# Patient Record
Sex: FEMALE | Race: WHITE | NOT HISPANIC OR LATINO | Employment: UNEMPLOYED | URBAN - METROPOLITAN AREA
[De-identification: names, ages, dates, MRNs, and addresses within clinical notes are randomized per-mention and may not be internally consistent; named-entity substitution may affect disease eponyms.]

---

## 2017-01-18 ENCOUNTER — OFFICE VISIT - HEALTHEAST (OUTPATIENT)
Dept: FAMILY MEDICINE | Facility: CLINIC | Age: 27
End: 2017-01-18

## 2017-01-18 ENCOUNTER — COMMUNICATION - HEALTHEAST (OUTPATIENT)
Dept: SCHEDULING | Facility: CLINIC | Age: 27
End: 2017-01-18

## 2017-01-18 DIAGNOSIS — Z34.90 PREGNANCY: ICD-10-CM

## 2017-01-18 DIAGNOSIS — J06.9 URI (UPPER RESPIRATORY INFECTION): ICD-10-CM

## 2017-01-18 DIAGNOSIS — J45.20 INTERMITTENT ASTHMA, UNCOMPLICATED: ICD-10-CM

## 2017-01-18 RX ORDER — SWAB
1 SWAB, NON-MEDICATED MISCELLANEOUS DAILY
Qty: 30 TABLET | Refills: 12 | Status: SHIPPED | OUTPATIENT
Start: 2017-01-18

## 2017-01-18 ASSESSMENT — MIFFLIN-ST. JEOR: SCORE: 1391.53

## 2017-02-07 ENCOUNTER — RECORDS - HEALTHEAST (OUTPATIENT)
Dept: ADMINISTRATIVE | Facility: OTHER | Age: 27
End: 2017-02-07

## 2017-02-09 ENCOUNTER — PRENATAL OFFICE VISIT - HEALTHEAST (OUTPATIENT)
Dept: FAMILY MEDICINE | Facility: CLINIC | Age: 27
End: 2017-02-09

## 2017-02-09 DIAGNOSIS — N91.2 AMENORRHEA: ICD-10-CM

## 2017-02-09 LAB — HIV 1+2 AB+HIV1 P24 AG SERPL QL IA: NEGATIVE

## 2017-02-10 LAB
HBV SURFACE AG SERPL QL IA: NEGATIVE
SYPHILIS RPR SCREEN - HISTORICAL: NORMAL

## 2017-02-13 ENCOUNTER — HOSPITAL ENCOUNTER (OUTPATIENT)
Dept: ULTRASOUND IMAGING | Facility: CLINIC | Age: 27
Discharge: HOME | End: 2017-02-13
Attending: FAMILY MEDICINE

## 2017-02-13 DIAGNOSIS — N91.2 AMENORRHEA: ICD-10-CM

## 2017-02-16 LAB
BKR LAB AP ABNORMAL BLEEDING: NO
BKR LAB AP BIRTH CONTROL/HORMONES: NORMAL
BKR LAB AP CERVICAL APPEARANCE: NORMAL
BKR LAB AP GYN ADEQUACY: NORMAL
BKR LAB AP GYN INTERPRETATION: NORMAL
BKR LAB AP HPV REFLEX: NORMAL
BKR LAB AP LMP: NORMAL
BKR LAB AP PATIENT STATUS: NORMAL
BKR LAB AP PREVIOUS ABNORMAL: NO
BKR LAB AP PREVIOUS NORMAL: NORMAL
HIGH RISK?: NO
PATH REPORT.COMMENTS IMP SPEC: NORMAL
RESULT FLAG (HE HISTORICAL CONVERSION): NORMAL

## 2017-02-18 ENCOUNTER — COMMUNICATION - HEALTHEAST (OUTPATIENT)
Dept: FAMILY MEDICINE | Facility: CLINIC | Age: 27
End: 2017-02-18

## 2017-04-14 ENCOUNTER — COMMUNICATION - HEALTHEAST (OUTPATIENT)
Dept: FAMILY MEDICINE | Facility: CLINIC | Age: 27
End: 2017-04-14

## 2017-04-18 ENCOUNTER — PRENATAL OFFICE VISIT - HEALTHEAST (OUTPATIENT)
Dept: FAMILY MEDICINE | Facility: CLINIC | Age: 27
End: 2017-04-18

## 2017-04-18 ENCOUNTER — COMMUNICATION - HEALTHEAST (OUTPATIENT)
Dept: FAMILY MEDICINE | Facility: CLINIC | Age: 27
End: 2017-04-18

## 2017-04-18 DIAGNOSIS — F41.9 ANXIETY: ICD-10-CM

## 2017-04-18 DIAGNOSIS — Z34.90 PREGNANCY: ICD-10-CM

## 2017-04-19 LAB — SYPHILIS RPR SCREEN - HISTORICAL: NORMAL

## 2017-05-09 ENCOUNTER — HOSPITAL ENCOUNTER (OUTPATIENT)
Dept: MEDSURG UNIT | Facility: CLINIC | Age: 27
Discharge: HOME | End: 2017-05-10
Attending: FAMILY MEDICINE | Admitting: FAMILY MEDICINE

## 2017-05-09 ENCOUNTER — COMMUNICATION - HEALTHEAST (OUTPATIENT)
Dept: SCHEDULING | Facility: CLINIC | Age: 27
End: 2017-05-09

## 2017-05-09 ENCOUNTER — COMMUNICATION - HEALTHEAST (OUTPATIENT)
Dept: FAMILY MEDICINE | Facility: CLINIC | Age: 27
End: 2017-05-09

## 2017-05-09 ASSESSMENT — MIFFLIN-ST. JEOR: SCORE: 1468.64

## 2017-05-23 ENCOUNTER — PRENATAL OFFICE VISIT - HEALTHEAST (OUTPATIENT)
Dept: FAMILY MEDICINE | Facility: CLINIC | Age: 27
End: 2017-05-23

## 2017-05-23 DIAGNOSIS — Z3A.32 32 WEEKS GESTATION OF PREGNANCY: ICD-10-CM

## 2017-06-06 ENCOUNTER — PRENATAL OFFICE VISIT - HEALTHEAST (OUTPATIENT)
Dept: FAMILY MEDICINE | Facility: CLINIC | Age: 27
End: 2017-06-06

## 2017-06-06 DIAGNOSIS — Z34.90 PREGNANCY: ICD-10-CM
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
## 2017-06-20 ENCOUNTER — PRENATAL OFFICE VISIT - HEALTHEAST (OUTPATIENT)
Dept: FAMILY MEDICINE | Facility: CLINIC | Age: 27
End: 2017-06-20

## 2017-06-20 DIAGNOSIS — N93.9 VAGINAL BLEEDING: ICD-10-CM

## 2017-06-20 DIAGNOSIS — Z3A.36 36 WEEKS GESTATION OF PREGNANCY: ICD-10-CM

## 2021-05-30 VITALS — HEIGHT: 64 IN | BODY MASS INDEX: 25.95 KG/M2 | WEIGHT: 152 LBS

## 2021-05-30 VITALS — BODY MASS INDEX: 29.47 KG/M2 | WEIGHT: 169 LBS

## 2021-05-30 VITALS — BODY MASS INDEX: 25.98 KG/M2 | WEIGHT: 149 LBS

## 2021-05-30 VITALS — BODY MASS INDEX: 27.32 KG/M2 | HEIGHT: 64 IN

## 2021-05-30 VITALS — BODY MASS INDEX: 27.32 KG/M2 | WEIGHT: 156.7 LBS

## 2021-05-31 ENCOUNTER — RECORDS - HEALTHEAST (OUTPATIENT)
Dept: ADMINISTRATIVE | Facility: CLINIC | Age: 31
End: 2021-05-31

## 2021-05-31 VITALS — BODY MASS INDEX: 30.34 KG/M2 | WEIGHT: 174 LBS

## 2021-05-31 VITALS — BODY MASS INDEX: 28.85 KG/M2 | HEIGHT: 64 IN | WEIGHT: 169 LBS

## 2021-05-31 VITALS — BODY MASS INDEX: 30.16 KG/M2 | WEIGHT: 173 LBS

## 2021-05-31 VITALS — WEIGHT: 174 LBS | BODY MASS INDEX: 30.34 KG/M2

## 2021-06-01 ENCOUNTER — RECORDS - HEALTHEAST (OUTPATIENT)
Dept: ADMINISTRATIVE | Facility: CLINIC | Age: 31
End: 2021-06-01

## 2021-06-02 ENCOUNTER — RECORDS - HEALTHEAST (OUTPATIENT)
Dept: ADMINISTRATIVE | Facility: CLINIC | Age: 31
End: 2021-06-02

## 2021-06-08 NOTE — PROGRESS NOTES
PRENATAL VISIT   FIRST OBSTETRICAL EXAM - OB    Assessment / Impression     26-year-old  014 at approximately 17-20 weeks gestation.  She is unsure of her last menstrual period because she is probably about 17-18 weeks gestation.  She clinically measures at 20 weeks' gestation.  4.  Refer her for an ultrasound.  Placenta get a fetal survey and get dating from this ultrasound also.  She is taking a prenatal vitamin and taking care of herself during the course of this pregnancy.  Factors for the pregnancy include a history of prior  delivery at 36 weeks and prenatal late prenatal care.  She has a history of bipolar and anxiety and is currently off her medications.  She wishes to try to stay off of them unless these become more problematic.  She has good support from her significant other and is currently caring for her 4 daughters at home.  She is not working.  Normal first prenatal visit at [facility]  Discussed orientation, general information, lifestyle, nutrition, exercise,warning signs, resources, lab testing, risk screening and discussed cystic fibrosis screening with patient.  Questions answered.    Plan:     Routine cares plan discussed to transfer care to provider in [place] when her insurance transfers   Initial labs drawn.  Prenatal vitamins.  Problem list reviewed and updated.  Genetic screening test options discussed:  Patient elects We will offer her genetic screening based on her dates we have the ultrasound is [facility].  Role of ultrasound in pregnancy discussed; fetal survey: ordered.  Follow up: No Follow-up on file.      Subjective:    Therese Garcia is a 26 y.o.  here today for her First Obstetrical Exam.  This is her 6 pregnancy.  She has 4 little girls at home and had 1 miscarriage.  The father of the baby and her fiancé is here with her today.  This was a planned pregnancy and the very excited about this pregnancy.  They do plan to move to [facility] and do intend  on transferring care there.  She has a history of 2  deliveries at 36 weeks otherwise her pregnancies were uncomplicated.  She is only taking prenatal vitamins.  She is a former smoker but is not currently smoking and has no other history of drug or alcohol use or abuse.  She has some fairly significant history of bipolar, anxiety and PTSD.  She states she is currently doing well off of her Lexapro might remain off of that for now.    OB History    Para Term  AB SAB TAB Ectopic Multiple Living   6 4 2 2 1 1    4      # Outcome Date GA Lbr Compa/2nd Weight Sex Delivery Anes PTL Lv   6 Current            5  08/28/15 36w0d  5 lb 13 oz (2.637 kg) F Vag-Spont EPI Y Y   4 Term 12 40w0d  10 lb (4.536 kg) F Vag-Spont EPI N Y   3 Term 11   7 lb 5 oz (3.317 kg) F Vag-Spont EPI N Y   2 SAB 06/09/10    U SAB      1  09 36w0d  5 lb 12 oz (2.608 kg) F Vag-Spont EPI Y Y          Expected Date of Delivery: Not found.    Past Medical History:   Diagnosis Date     Anxiety      Asthma      Migraine      Past Surgical History:   Procedure Laterality Date     DILATION AND CURETTAGE OF UTERUS       Nexplanon Placement       Social History   Substance Use Topics     Smoking status: Former Smoker     Types: Cigarettes     Quit date: 2014     Smokeless tobacco: Never Used     Alcohol use No      Comment: rare     Current Outpatient Prescriptions   Medication Sig Dispense Refill     acetaminophen (TYLENOL) 500 MG tablet Take 1,000 mg by mouth every 6 (six) hours as needed for pain.       albuterol (PROVENTIL HFA;VENTOLIN HFA) 90 mcg/actuation inhaler Inhale 2 puffs every 4 (four) hours as needed for wheezing. 1 Inhaler 0     ALPRAZolam (XANAX) 1 MG tablet Take 1 tablet (1 mg total) by mouth 2 (two) times a day as needed. 20 tablet 0     aspirin-acetaminophen-caffeine (EXCEDRIN MIGRAINE) 250-250-65 mg per tablet Take 1 tablet by mouth every 6 (six) hours as needed for pain.        escitalopram oxalate (LEXAPRO) 20 MG tablet Take 1 tablet (20 mg total) by mouth bedtime. 90 tablet 0     ibuprofen (ADVIL,MOTRIN) 200 MG tablet Take 400 mg by mouth every 6 (six) hours as needed for pain.       nebulizer and compressor Magdalena Please dispense nebulizer machine 1 each 0     prenatal vitamin iron-folic acid 27mg-0.8mg (PRENATAL S) 27 mg iron- 800 mcg Tab tablet Take 1 tablet by mouth daily. 30 tablet 12     vitamin A & D ointment Apply 1 application topically as needed.       acetaminophen-mag sal-pamabrom (PAMPRIN, WITH MAG SALICYLATE,) 250-250-25 mg Tab Take 2 tablets by mouth every 6 (six) hours as needed.       albuterol (PROVENTIL HFA;VENTOLIN HFA) 90 mcg/actuation inhaler Inhale 2 puffs every 4 (four) hours as needed for wheezing. 1 Inhaler 0     SUMAtriptan (IMITREX) 50 MG tablet Take 1 tablet (50 mg total) by mouth every 2 (two) hours as needed for migraine. 10 tablet 2     No current facility-administered medications for this visit.      Allergies   Allergen Reactions     Hydrocodone              High Risk Behavior: Currently unmarried and Previous  labor with term delivery    Review of Systems  General:  Denies problem  Eyes: Denies problem  Ears/Nose/Throat: Denies problem  Cardiovascular: Denies problem  Respiratory:  Denies problem  Gastrointestinal:  Denies problem, Genitourinary: Denies problem  Musculoskeletal:  Denies problem  Skin: Denies problem  Neurologic: Denies problem  Psychiatric: Denies problem  Endocrine: Denies problem  Heme/Lymphatic: Denies problem   Allergic/Immunologic: Denies problem       Objective:   Objective    Vitals:    17 1357   BP: 118/84   Weight: 149 lb (67.6 kg)     Physical Exam:  General Appearance: Alert, cooperative, no distress, appears stated age  Head: Normocephalic, without obvious abnormality, atraumatic  Eyes: PERRL, conjunctiva/corneas clear, EOM's intact  Ears: Normal TM's and external ear canals, both ears  Nose: Nares normal, septum  midline,mucosa normal, no drainage  Throat: Lips, mucosa, and tongue normal; teeth and gums normal  Neck: Supple, symmetrical, trachea midline, no adenopathy;  thyroid: not enlarged, symmetric, no tenderness/mass/nodules; no carotid bruit or JVD  Back: Symmetric, no curvature, ROM normal, no CVA tenderness  Lungs: Clear to auscultation bilaterally, respirations unlabored  Breasts: No breast masses, tenderness, asymmetry, or nipple discharge.  Heart: Regular rate and rhythm, S1 and S2 normal, no murmur, rub, or gallop, Abdomen: Soft, non-tender, bowel sounds active all four quadrants,  no masses, no organomegaly  Pelvic:Normally developed genitalia with no external lesions or eruptions. Vagina and cervix show no lesions, inflammation, discharge or tenderness. No cystocele, No rectocele. Uterus 20 weeks.  No adnexal mass or tenderness.    Extremities: Extremities normal, atraumatic, no cyanosis or edema  Skin: Skin color, texture, turgor normal, no rashes or lesions  Lymph nodes: Cervical, supraclavicular, and axillary nodes normal  Neurologic: Normal     Lab:   Results for orders placed or performed in visit on 02/09/17   Pregnancy (Beta-hCG, Qual), Urine   Result Value Ref Range    Pregnancy Test, Urine Positive (!) Negative

## 2021-06-08 NOTE — PROGRESS NOTES
Assessment/Plan:     1. Intermittent asthma, uncomplicated  2. URI (upper respiratory infection)  Has albuterol inhaler at home.  No signs of needing antibiotics at this time but call return to care if symptoms worsen or does not improve.  Discussed medication today for pregnancy.  - guaiFENesin (ROBITUSSIN) 100 mg/5 mL syrup; Take 10 mL (200 mg total) by mouth 3 (three) times a day as needed for cough or congestion.  Dispense: 120 mL; Refill: 1    3. Pregnancy  We will plan to establish with OB provider.  Gave Reglan and prescription of prenatal vitamins  - metoclopramide (REGLAN) 5 MG tablet; Take 1 tablet (5 mg total) by mouth 4 (four) times a day as needed for nausea.  Dispense: 20 tablet; Refill: 0  - prenatal vitamin iron-folic acid 27mg-0.8mg (PRENATAL S) 27 mg iron- 800 mcg Tab tablet; Take 1 tablet by mouth daily.  Dispense: 30 tablet; Refill: 12    Subjective:      Therese Garcia is a 26 y.o. female comes in today with upper respiratory infection.  She states that she has had a dry cough started about 3 days ago.  She states she had difficulty sleeping last night because of the cough she states she felt a little clammy does have a history of asthma and has albuterol inhaler at home.  She has had no fevers the cough is nonproductive she states that she is about 13 weeks pregnant.  She has had a little nausea with this pregnancy has been established with OB provider yet.  No significant abdominal pain or bleeding.  200 medications are safe for pregnant upper respiratory infections.  Otherwise no other new concerns.  Reviewed last note with primary care provider.    Current Outpatient Prescriptions   Medication Sig Dispense Refill     acetaminophen (TYLENOL) 500 MG tablet Take 1,000 mg by mouth every 6 (six) hours as needed for pain.       acetaminophen-mag sal-pamabrom (PAMPRIN, WITH MAG SALICYLATE,) 250-250-25 mg Tab Take 2 tablets by mouth every 6 (six) hours as needed.       albuterol (PROVENTIL  HFA;VENTOLIN HFA) 90 mcg/actuation inhaler Inhale 2 puffs every 4 (four) hours as needed for wheezing. 1 Inhaler 0     albuterol (PROVENTIL HFA;VENTOLIN HFA) 90 mcg/actuation inhaler Inhale 2 puffs every 4 (four) hours as needed for wheezing. 1 Inhaler 0     ALPRAZolam (XANAX) 1 MG tablet Take 1 tablet (1 mg total) by mouth 2 (two) times a day as needed. 20 tablet 0     aspirin-acetaminophen-caffeine (EXCEDRIN MIGRAINE) 250-250-65 mg per tablet Take 1 tablet by mouth every 6 (six) hours as needed for pain.       escitalopram oxalate (LEXAPRO) 20 MG tablet Take 1 tablet (20 mg total) by mouth bedtime. 90 tablet 0     ibuprofen (ADVIL,MOTRIN) 200 MG tablet Take 400 mg by mouth every 6 (six) hours as needed for pain.       nebulizer and compressor Magdalena Please dispense nebulizer machine 1 each 0     prenatal vitamin iron-folic acid 27mg-0.8mg (PRENATAL S) 27 mg iron- 800 mcg Tab tablet Take 1 tablet by mouth daily. 30 tablet 12     SUMAtriptan (IMITREX) 50 MG tablet Take 1 tablet (50 mg total) by mouth every 2 (two) hours as needed for migraine. 10 tablet 2     vitamin A & D ointment Apply 1 application topically as needed.       No current facility-administered medications for this visit.        Past Medical History, Family History, and Social History reviewed.  Past Medical History:   Diagnosis Date     Anxiety      Asthma      Migraine      Past Surgical History:   Procedure Laterality Date     DILATION AND CURETTAGE OF UTERUS       Nexplanon Placement       Hydrocodone  Family History   Problem Relation Age of Onset     Depression Mother      Depression Father      Social History     Social History     Marital status:      Spouse name: N/A     Number of children: N/A     Years of education: N/A     Occupational History     Not on file.     Social History Main Topics     Smoking status: Former Smoker     Types: Cigarettes     Quit date: 9/12/2014     Smokeless tobacco: Never Used     Alcohol use Yes       "Comment: rare     Drug use: No     Sexual activity: Yes     Partners: Male     Birth control/ protection: Implant      Comment: engaged      Other Topics Concern     Not on file     Social History Narrative    Lives with family.         Review of systems is as stated in HPI, and the remainder of the 10 system review is otherwise negative.    Objective:     Vitals:    01/18/17 0837   BP: 104/66   Pulse: (!) 107   Temp: 98.3  F (36.8  C)   SpO2: 94%   Weight: 152 lb (68.9 kg)   Height: 5' 3.5\" (1.613 m)    Body mass index is 26.5 kg/(m^2).    General Appearance:    Alert, cooperative, no distress, appears stated age   Head:    Normocephalic, without obvious abnormality, atraumatic   Eyes:    PERRL, EOM's intact   Ears:    Normal TM's and external ear canals   Nose:   Mucosa normal, no drainage     or sinus tenderness   Throat:   Oropharynx is clear   Neck:   Supple, symmetrical, no adenopathy, no thyromegally       Lungs:     Clear to auscultation bilaterally, respirations unlabored   Chest Wall:    No tenderness or deformity    Heart:    Regular rate and rhythm, S1 and S2 normal, no murmur, rub    or gallop                   Extremities:   Extremities normal, atraumatic, no cyanosis or edema       Skin:   No rashes or lesions       This note has been dictated using voice recognition software. Any grammatical or context distortions are unintentional and inherent to the the software.   "

## 2021-06-10 NOTE — PROGRESS NOTES
Patient given discharge instructions. Patient left with belongings and ambulating with no difficulty.

## 2021-06-10 NOTE — PROGRESS NOTES
Offered progenity for genetic screening and pt declined  Reviewed fetal survey- normal at 17 weeks  Feels lightheaded- check hemogram  Insurance states she should continue to come here for prenatal care but deliver at [place]- will continue to help her with this  Rhogam, 1hr gtt syphilis today,  Decrease mood, not taking any meds currently. Feels anxious.  Has hx of bipolar depression- start sertraline, follow up in 2 weeks.

## 2021-06-10 NOTE — PROGRESS NOTES
Doing well  No abdominal pain, cramping, urinary symptoms  Good fetal movement  Plants to deliver in [place] - will be moving end of June to [place] fall  Baby girl- 4 girls at home- this is second child with current spouse  Follow up in 2 weeks  Anxiety/PTSD better controlled on sertraline, still with some symptoms- increase to 50 mg daily, new rx sent

## 2021-06-11 NOTE — PROGRESS NOTES
Had a deer tick on her this morning.  Felt some nausea.  She states she was out fishing last night and feels that that is probably where she did get the tick.  We discussed options of waiting and signs of Lyme's including fever and target lesions.  Should like to be treated empirically since she is pregnant and also moving to assure that there is no lapse in treatment.  We prescribed amoxicillin 500 mg 3 times daily for 14 days.  Otherwise she is doing well with regards to the pregnancy.  Good fetal movement.  No contractions, edema, or headaches.  She continues on her Zoloft and feels that her symptoms for bipolar well controlled.  She is looking forward to moving.  She intends to continue with her prenatal care here but intends to deliver at [facility].

## 2021-06-11 NOTE — PROGRESS NOTES
26-year-old  with a negative blood type presents today at 35 weeks and 2 days gestation.  She reports that she has been having some bleeding over the last day.  She states it was some spotting and was bright red blood.  She has not had intercourse for 2 days.  She is not experiencing any fluid leakage.  No abdominal pain or trauma.  She is feeling good fetal movement.  She denies any contractions.  On exam her abdomen is soft and measuring appropriately for gestational age.  Fetal heart tones are 148.  Pelvic exam reveals clear to yellow mucus without any blood or blood tingeing.  Cervix appears long and closed.  Due to the reported bleeding and a negative blood type, RhoGam is administered.  GBS is done today.  We will send a wet prep as well as a UA UC.  She is planning to move to [place] and plans to transfer her care and deliver at [facility].  She will arrange for her next office visit to be within the next week so she can establish care with a new provider.

## 2021-06-15 PROBLEM — Z34.90 PREGNANCY: Status: ACTIVE | Noted: 2017-04-18

## 2023-09-18 NOTE — PROGRESS NOTES
".       PM&R Clinic Note     Patient Name: Therese Garcia : 1990 Medical Record: [id number]     Requesting Physician/clinician: No att. providers found           History of Present Illness:     Therese Garcia is 32 year old female referred for concussion evaluation.    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 No LOC but \"zoned out\". Injury sustained     Currently making gains with PT & OT.   Reports dizziness with standing for more than 8 min and results in LOC with the last episode happened 3 weeks ago.   C/O nausea that improved. No recent episode of vomiting.   Reports poor sleep quality with challenges falling asleep and having interrupted sleep.  Mood has been unstable since the concussion. Feels easily irritated and upset as she does not making progress as she anticipated. Per  (who was present in the visit) patient is easily irritated. No safety concerns.     Reports worsened short term memory that is getting better with \"brain fog\" but no safety concerns.  Currently studying mental health in college and had to take time out.    Has been off work and employer is accommodative.    Patient has multiple concussion during childhood.           Past Medical and Surgical History:     No past medical history on file.  Past Surgical History:   Procedure Laterality Date    DILATION AND CURETTAGE      OTHER SURGICAL HISTORY      Nexplanon Placement            Social History:     Social History     Tobacco Use    Smoking status: Some Days     Types: Cigarettes     Last attempt to quit: 2014     Years since quittin.0    Smokeless tobacco: Never    Tobacco comments:     Smokes a few times a week   Substance Use Topics    Alcohol use: No     Comment: Alcoholic Drinks/day: rare            Functional history:       ADLs: as above  iADLs (medication management and finances): as above           Family History:     Family History   Problem " "Relation Age of Onset    Depression Mother     Depression Father             Medications:     Current Outpatient Medications   Medication Sig Dispense Refill    acetaminophen (TYLENOL) 500 MG tablet [ACETAMINOPHEN (TYLENOL) 500 MG TABLET] Take 1,000 mg by mouth every 6 (six) hours as needed for pain.      acetaminophen-mag sal-pamabrom (PAMPRIN, WITH MAG SALICYLATE,) 250-250-25 mg Tab [ACETAMINOPHEN-MAG SAL-PAMABROM (PAMPRIN, WITH MAG SALICYLATE,) 250-250-25 MG TAB] Take 2 tablets by mouth every 6 (six) hours as needed.      albuterol (PROVENTIL HFA;VENTOLIN HFA) 90 mcg/actuation inhaler [ALBUTEROL (PROVENTIL HFA;VENTOLIN HFA) 90 MCG/ACTUATION INHALER] Inhale 2 puffs every 4 (four) hours as needed for wheezing. 1 Inhaler 0    albuterol (PROVENTIL HFA;VENTOLIN HFA) 90 mcg/actuation inhaler [ALBUTEROL (PROVENTIL HFA;VENTOLIN HFA) 90 MCG/ACTUATION INHALER] Inhale 2 puffs every 4 (four) hours as needed for wheezing. 1 Inhaler 0    nebulizer and compressor Seema [NEBULIZER AND COMPRESSOR SEEMA] Please dispense nebulizer machine 1 each 0    prenatal vitamin iron-folic acid 27mg-0.8mg (PRENATAL S) 27 mg iron- 800 mcg Tab tablet [PRENATAL VITAMIN IRON-FOLIC ACID 27MG-0.8MG (PRENATAL S) 27 MG IRON- 800 MCG TAB TABLET] Take 1 tablet by mouth daily. 30 tablet 12    sertraline (ZOLOFT) 50 MG tablet [SERTRALINE (ZOLOFT) 50 MG TABLET] Take 1 tablet (50 mg total) by mouth daily. 30 tablet 3    vitamin A & D ointment [VITAMIN A & D OINTMENT] Apply 1 application topically as needed.              Allergies:     Allergies   Allergen Reactions    Hydrocodone Unknown              ROS:     A focused ROS is negative other than the symptoms noted above in the HPI.           Physical Examiniation:     VITAL SIGNS: There were no vitals taken for this visit.  BMI: Estimated body mass index is 30.34 kg/m  as calculated from the following:    Height as of 5/9/17: 1.613 m (5' 3.5\").    Weight as of 6/20/17: 78.9 kg (174 lb).    Gen: NAD, pleasant " and cooperative   Neuro/MSK:   Normal gait.  Normal complete neuro exam  No UMN signs identified         Laboratory/Imaging:     INDICATION: Memory loss   COMPARISON: None.   TECHNIQUE: Routine CT Head without IV contrast. Multiplanar reformats. Dose reduction techniques were used.     FINDINGS:     INTRACRANIAL CONTENTS: Gray-white matter differentiation is maintained. No hemorrhage or extraaxial collection. No mass effect. Subarachnoid cisterns are patent. Normal parenchymal attenuation for age. Normal ventricles and sulci.     VISUALIZED ORBITS/SINUSES/MASTOIDS: No visible intraorbital abnormality. Paranasal sinuses are clear. No middle ear or mastoid effusion.     BONES/SOFT TISSUES: No acute abnormality.            Assessment/Plan:     .(S06.0XAA) Concussion with unknown loss of consciousness status, initial encounter  (primary encounter diagnosis)      Patient education: In depth discussion and education was provided about the assessment and implications of each of the below recommendations for management. Patient indicated readiness to learn, all questions were answered and understanding of material presented was confirmed.    Work-up: none needed at this time    Therapy/equipment/braces: SLP to help with cognitive rehabilitation.    Medications: none needed at this time    Referral / follow up with other providers: neuropsychology for a complete cognitive evaluation. Dr. Ferrara for post concussion mood care    Follow up: 3 months  A letter to be off work until then was issued.    Tenisha Hardin MD  Physical Medicine & Rehabilitation    90 minutes spent on the date of the encounter doing chart review, history and exam, documentation and further activities as noted above

## 2023-09-19 ENCOUNTER — OFFICE VISIT (OUTPATIENT)
Dept: PHYSICAL MEDICINE AND REHAB | Facility: CLINIC | Age: 33
End: 2023-09-19
Payer: COMMERCIAL

## 2023-09-19 VITALS — HEART RATE: 93 BPM | SYSTOLIC BLOOD PRESSURE: 109 MMHG | DIASTOLIC BLOOD PRESSURE: 71 MMHG

## 2023-09-19 DIAGNOSIS — S06.0XAA CONCUSSION WITH UNKNOWN LOSS OF CONSCIOUSNESS STATUS, INITIAL ENCOUNTER: Primary | ICD-10-CM

## 2023-09-19 PROCEDURE — 99417 PROLNG OP E/M EACH 15 MIN: CPT | Performed by: PHYSICAL MEDICINE & REHABILITATION

## 2023-09-19 PROCEDURE — 99205 OFFICE O/P NEW HI 60 MIN: CPT | Performed by: PHYSICAL MEDICINE & REHABILITATION

## 2023-09-19 NOTE — LETTER
September 19, 2023      Therese Garcia  [address]  [place] [address]        To Whom It May Concern:    Therese Garcia  was seen on 9/19/23.  Please excuse her until further evaluation in 3 months      Sincerely,        Tenisha Hardin MD

## 2023-11-02 ENCOUNTER — OFFICE VISIT (OUTPATIENT)
Dept: NEUROLOGY | Facility: CLINIC | Age: 33
End: 2023-11-02
Attending: PHYSICAL MEDICINE & REHABILITATION
Payer: COMMERCIAL

## 2023-11-02 DIAGNOSIS — F43.10 PTSD (POST-TRAUMATIC STRESS DISORDER): ICD-10-CM

## 2023-11-02 DIAGNOSIS — F43.23 ADJUSTMENT DISORDER WITH MIXED ANXIETY AND DEPRESSED MOOD: Primary | ICD-10-CM

## 2023-11-02 DIAGNOSIS — S06.0XAA CONCUSSION WITH UNKNOWN LOSS OF CONSCIOUSNESS STATUS, INITIAL ENCOUNTER: ICD-10-CM

## 2023-11-02 DIAGNOSIS — G47.00 INSOMNIA, UNSPECIFIED TYPE: ICD-10-CM

## 2023-11-02 PROCEDURE — 96132 NRPSYC TST EVAL PHYS/QHP 1ST: CPT | Performed by: CLINICAL NEUROPSYCHOLOGIST

## 2023-11-02 PROCEDURE — 96116 NUBHVL XM PHYS/QHP 1ST HR: CPT | Performed by: CLINICAL NEUROPSYCHOLOGIST

## 2023-11-02 PROCEDURE — 96133 NRPSYC TST EVAL PHYS/QHP EA: CPT | Performed by: CLINICAL NEUROPSYCHOLOGIST

## 2023-11-02 PROCEDURE — 96138 PSYCL/NRPSYC TECH 1ST: CPT | Performed by: CLINICAL NEUROPSYCHOLOGIST

## 2023-11-02 NOTE — LETTER
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 Figure Recall).     On a test that requires speeded alpha-numeric sequencing/cognitive set-shifting (Trails [facility]), performance was exceptionally low and with 1 error.      On the PHQ-9, a self-report measure of depressive symptoms, she obtained a score of 9, placing her in the range of mild depression. She denied suicidal ideation. On the HOLLY-7, a self-report measure of anxiety, she obtained a score of 6,  placing her in the range of mild anxiety.    On the SCAT-3, a self-report measure of concussive symptoms, the patient endorsed a total score of 18/22, with a symptom severity score of 29/132 (generally mild). Specifically, she endorsed mild symptoms of headache, pressure in the head, neck pain, dizziness, balance problems, sensitivity to light, feeling slowed down, feeling in a fog, not feeling right, fatigue or low energy, confusion, trouble falling asleep, feeling more emotional, irritability, sadness and nervous or anxious. Moderate symptoms of difficulty concentrating were endorsed. Lastly, the patient endorsed severe symptoms of difficulty remembering.      ____________________________________________________________________________________    SERVICES PROVIDED & TIME:  On-site Office Visit Details   Verbal consent for neuropsychological testing was received following the provision of information about the nature and purpose of the evaluation, and the opportunity to ask questions. Verbal permission to route a copy of the final report to her primary care provider was also obtained.  A clinical interview/neurobehavioral status examination was conducted with the patient and documented. I thoroughly reviewed the medical record, selected the neuropsychological test battery, provided supervision to the trained examiner/technician, interpreted/integrated patient data and test results, and engaged in clinical decision making, treatment planning, report writing/preparation and and provision of same-day feedback of test results. A trained examiner/technician administered and scored the neuropsychological tests (2+ tests).  Please see below for a breakdown of time spent and the associated codes billed for these services.  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 She was referred for an evaluation of mild neurocognitive disorder.         The patient was seen for a neuropsychological evaluation for the purposes of diagnostic clarification and treatment planning. 35 minutes of face-to-face testing were provided by this  writer. An additional 5 minutes were spent scoring and compiling test results. The patient was cooperative with testing. No concerns were brought to my attention. Please see Dr. Pablo's report for a detailed description of the charges and interpretation and integration of the findings.       Again, thank you for allowing me to participate in the care of your patient.        Sincerely,        Anirudh [email or web address], LP

## 2023-11-02 NOTE — PROGRESS NOTES
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 HOLLY-7, a self-report measure of anxiety, she obtained a score of 6,  placing her in the range of mild anxiety.    On the SCAT-3, a self-report measure of concussive symptoms, the patient endorsed a total score of 18/22, with a symptom severity score of 29/132 (generally mild). Specifically, she endorsed mild symptoms of headache, pressure in the head, neck pain, dizziness, balance problems, sensitivity to light, feeling slowed down, feeling in a fog, not feeling right, fatigue or low energy, confusion, trouble falling asleep, feeling more emotional, irritability, sadness and nervous or anxious. Moderate symptoms of difficulty concentrating were endorsed. Lastly, the patient endorsed severe symptoms of difficulty remembering.     ____________________________________________________________________________________    SERVICES PROVIDED & TIME:  On-site Office Visit Details   Verbal consent for neuropsychological testing was received following the provision of information about the nature and purpose of the evaluation, and the opportunity to ask questions. Verbal permission  to route a copy of the final report to her primary care provider was also obtained.  A clinical interview/neurobehavioral status examination was conducted with the patient and documented. I thoroughly reviewed the medical record, selected the neuropsychological test battery, provided supervision to the trained examiner/technician, interpreted/integrated patient data and test results, and engaged in clinical decision making, treatment planning, report writing/preparation and and provision of same-day feedback of test results. A trained examiner/technician administered and scored the neuropsychological tests (2+ tests).  Please see below for a breakdown of time spent and the associated codes billed for these services.  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 She was referred for an evaluation of mild neurocognitive disorder.

## 2023-11-02 NOTE — PROGRESS NOTES
The patient was seen for a neuropsychological evaluation for the purposes of diagnostic clarification and treatment planning. 35 minutes of face-to-face testing were provided by this writer. An additional 5 minutes were spent scoring and compiling test results. The patient was cooperative with testing. No concerns were brought to my attention. Please see Dr. Pablo's report for a detailed description of the charges and interpretation and integration of the findings.

## 2023-11-02 NOTE — PATIENT INSTRUCTIONS
Cognitive Strategies  Take notes! Keep a small notepad with you in your purse/pocket/bag and write things down that you want to remember. You might also keep a notepad in a convenient location in your home (e.g., next to your telephone or calendar). Taking your notes in a note pad (instead of on multiple post-it notes) might help you stay organized, and you will also remember where to go to look for the notes that you took.  Create checklists, grocery lists, and to-do lists. Cross things off as you finish them.  Use a calendar or planner and get in the habit of checking it daily. Make it a part of your morning and/or nighttime routine to remind yourself of upcoming events, activities, or appointments. Cross the days off as they pass so that you can quickly glance at the calendar to know what day it is and what you have going on.  Set alarm reminders. For example, you can set an alarm to remind you to take your medications, turn off the oven, turn off the sprinkler outside, or to start getting ready for your appointment. If you use a smart phone, often times you can label the alarm that goes off so that you know what the alarm is for when it chimes.  Use a pillbox to follow your medication regimen. A pillbox acts as a nice visual cue to remind us to take our medications. If you have trouble remembering whether or not you have already taken your medications today, a pillbox can be extremely helpful to help with this issue.  Use a GPS when driving to help you stay on route.  When having an important conversation or completing an important task, reduce as many distractions in the environment as you can. For example, turn off the TV or the music in the background. Turn off or silence your cell phone. Clear your work area of everything that you do not need for the task at hand.  Establish set locations for certain items. For example, if you keep your keys on a hook by your door, you will always know where to go to look  for them.   Maintain a daily routine, especially for morning and nighttime tasks.      Sleep Hygiene    What is Sleep Hygiene?  'Sleep hygiene' is the term used to describe good sleep habits. Considerable research has gone into developing a set of guidelines and tips which are designed to enhance good sleeping, and there is much evidence to suggest that these strategies can provide long-term solutions to sleep difficulties.     Sleep Hygiene Tips:  1) Get regular. One of the best ways to train your body to sleep well is to go to bed and get up at more or less the same time every day, even on weekends and days off! This regular rhythm will make you feel better and will give your body something to work from.    2) Sleep when sleepy. Only try to sleep when you actually feel tired or sleepy, rather than spending too much time awake in bed.    3) Get up & try again. If you haven't been able to get to sleep after about 20 minutes or more, get up and do something calming or boring until you feel sleepy, then return to bed and try again. Sit quietly on the couch with the lights off (bright light will tell your brain that it is time to wake up), or read something boring like the phone book. Avoid doing anything that is too stimulating or interesting, as this will wake you up even more.    4) Avoid caffeine & nicotine. It is best to avoid consuming any caffeine (in coffee, tea, cola drinks, chocolate, and some medications) or nicotine (cigarettes) for at least 4-6 hours before going to bed. These substances act as stimulants and interfere with the ability to fall asleep.    5) Avoid alcohol. It is also best to avoid alcohol for at least 4-6 hours before going to bed. Many people believe that alcohol is relaxing and helps them to get to sleep at first, but it actually interrupts the quality of sleep.    6) Bed is for sleeping. Try not to use your bed for anything other than sleeping and sex, so that your body comes to  associate bed with sleep. If you use bed as a place to watch TV, eat, read, work on your laptop, pay bills, and other things, your body will not learn this connection.    7) No naps. It is best to avoid taking naps during the day, to make sure that you are tired at bedtime. If you can't make it through the day without a nap, make sure it is for less than an hour and before 3pm.    8) Sleep rituals. You can develop your own rituals of things to remind your body that it is time to sleep - some people find it useful to do relaxing stretches or breathing exercises for 15 minutes before bed each night, or sit calmly with a cup of caffeine-free tea.    9) Bath time. Having a hot bath 1-2 hours before bedtime can be useful, as it will raise your body temperature, causing you to feel sleepy as your body temperature drops again. Research shows that sleepiness is associated with a drop in body temperature.    10) No clock-watching. Many people who struggle with sleep tend to watch the clock too much. Frequently checking the clock during the night can wake you up (especially if you turn on the light to read the time) and reinforces negative thoughts such as  Oh no, look how late it is, I'll never get to sleep  or  it's so early, I have only slept for 5 hours, this is terrible.     11) Use a sleep diary. This worksheet can be a useful way of making sure you have the right facts about your sleep, rather than making assumptions. Because a diary involves watching the clock (see point 10) it is a good idea to only use it for two weeks to get an idea of what is going and then perhaps two months down the track to see how you are progressing.    12) Exercise. Regular exercise is a good idea to help with good sleep, but try not to do strenuous exercise in the 4 hours before bedtime. Morning walks are a great way to start the day feeling refreshed!    13) Eat right. A healthy, balanced diet will help you to sleep well, but timing is  important. Some people find that a very empty stomach at bedtime is distracting, so it can be useful to have a light snack, but a heavy meal soon before bed can also interrupt sleep. Some people recommend a warm glass of milk, which contains tryptophan, which acts as a natural sleep inducer.    14) The right space. It is very important that your bed and bedroom are quiet and comfortable for sleeping. A cooler room with enough blankets to stay warm is best, and make sure you have curtains or an eye mask to block out early morning light and earplugs if there is noise outside your room.    15) Keep daytime routine the same. Even if you have a bad night sleep and are tired it is important that you try to keep your daytime activities the same as you had planned. That is, don't avoid activities because you feel tired. This can reinforce the insomnia.    16) Take time to plan during the day. Do your thoughts keep you up all night? It is common for people to plan out the next day or run through their mental to-do list when they lay down to sleep. Instead of keeping your mind active in this way at night, take time to plan out the next day or week in the morning or afternoon and write down your plan/to-do list in a notebook or calendar. That way, when your thoughts drift to planning at night, you can put your mind at ease more quickly by reminding yourself that everything is already planned out.      Relaxation Techniques    Search on YouTube and follow along with the videos:  Diaphragmatic (deep) Breathing  Progressive Muscle Relaxation  Guided Imagery/Visualization  Meditation  Mindfulness activities    Relaxation Apps (5-10 minute guided relaxation audios/videos):  Calm  Head Space  Virtual Hope Box    Other Relaxing Activities:  Get some fresh air  Exercise/go for a walk  Take a warm bath  Adult coloring books  Relaxing music  Other activities you enjoy...

## 2023-12-01 ENCOUNTER — OFFICE VISIT (OUTPATIENT)
Dept: NEUROLOGY | Facility: CLINIC | Age: 33
End: 2023-12-01
Payer: COMMERCIAL

## 2023-12-01 DIAGNOSIS — S06.0XAA CONCUSSION WITH UNKNOWN LOSS OF CONSCIOUSNESS STATUS, INITIAL ENCOUNTER: ICD-10-CM

## 2023-12-01 DIAGNOSIS — F33.1 MAJOR DEPRESSIVE DISORDER, RECURRENT EPISODE, MODERATE (H): Primary | ICD-10-CM

## 2023-12-01 PROCEDURE — 90791 PSYCH DIAGNOSTIC EVALUATION: CPT | Performed by: PSYCHOLOGIST
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
## 2024-05-18 ENCOUNTER — HEALTH MAINTENANCE LETTER (OUTPATIENT)
Age: 34
End: 2024-05-18

## 2025-06-08 ENCOUNTER — HEALTH MAINTENANCE LETTER (OUTPATIENT)
Age: 35
End: 2025-06-08